# Patient Record
Sex: FEMALE | Race: WHITE | NOT HISPANIC OR LATINO | URBAN - NONMETROPOLITAN AREA
[De-identification: names, ages, dates, MRNs, and addresses within clinical notes are randomized per-mention and may not be internally consistent; named-entity substitution may affect disease eponyms.]

---

## 2019-07-02 NOTE — PATIENT DISCUSSION
Retinal tear and detachment warning symptoms reviewed and patient instructed to call immediately if increasing floaters, flashes, or decreasing peripheral vision. yes

## 2020-09-17 ENCOUNTER — IMPORTED ENCOUNTER (OUTPATIENT)
Dept: URBAN - NONMETROPOLITAN AREA CLINIC 1 | Facility: CLINIC | Age: 60
End: 2020-09-17

## 2020-09-17 PROBLEM — Z01.818: Noted: 2020-09-17

## 2020-09-17 PROBLEM — H25.813: Noted: 2020-09-17

## 2020-09-17 PROBLEM — E78.5: Noted: 2020-09-17

## 2020-09-17 PROBLEM — I10: Noted: 2020-09-17

## 2020-09-17 PROCEDURE — 92004 COMPRE OPH EXAM NEW PT 1/>: CPT

## 2020-09-17 NOTE — PATIENT DISCUSSION
Cataract OU-Visually significant cataract OU.-Cataract(s) causing symptomatic impairment of visual function not correctable with a tolerable change in glasses or contact lenses lighting or non-operative means resulting in specific activity limitations and/or participation restrictions including but not limited to reading viewing television driving or meeting vocational or recreational needs. -Expectation is clearer vision and functional improvement in symptoms as well as reduced glare disability after cataract removal.-Order IOLMaster and OPD today. -Recommend Standard/Traditional OU based on today's OPD testing and lifestyle questionnaire.-All questions were answered regarding surgery including pre and post-op medications appointments activity restrictions and anesthetic usage.-The risks benefits and alternatives and special risk factors for the patient were discussed in detail including but not limited to: bleeding infection retinal detachment vitreous loss problems with the implant and possible need for additional surgery.-Although rare the possibility of complete vision loss was discussed.-The possible need for glasses post-operatively was discussed.-Order medical clearance exam based on history of hypertension and diabetes. -Patient elects to proceed with cataract surgery OD. Will schedule at patient's convenience and re-evaluate OS in the future. ***Patient elects Standard/Traditional OU starting with OD**Recommend Viscoat OU

## 2020-09-18 ENCOUNTER — IMPORTED ENCOUNTER (OUTPATIENT)
Dept: URBAN - NONMETROPOLITAN AREA CLINIC 1 | Facility: CLINIC | Age: 60
End: 2020-09-18

## 2020-09-18 PROBLEM — H25.813: Noted: 2020-09-18

## 2020-10-02 ENCOUNTER — IMPORTED ENCOUNTER (OUTPATIENT)
Dept: URBAN - NONMETROPOLITAN AREA CLINIC 1 | Facility: CLINIC | Age: 60
End: 2020-10-02

## 2020-10-02 PROBLEM — H25.812: Noted: 2020-10-02

## 2020-10-02 PROBLEM — Z98.41: Noted: 2020-10-02

## 2020-10-02 PROCEDURE — 99024 POSTOP FOLLOW-UP VISIT: CPT

## 2020-10-02 PROCEDURE — 92136 OPHTHALMIC BIOMETRY: CPT

## 2020-10-02 PROCEDURE — 66984 XCAPSL CTRC RMVL W/O ECP: CPT

## 2020-10-02 NOTE — PATIENT DISCUSSION
s/p PCIOL CE OD Standard/Trad -Pt doing well s/p PCIOL. -Continue post-op gtts according to instruction sheet and sleep with eye shield over eye for 7 nights.-Avoid bending at the waist lifting anything over 5lbs and dirty or bari environments. Cataract OS -Visually significant.-Cataract(s) causing symptomatic impairment of visual function not correctable with a tolerable change in glasses or contact lenses lighting or non-operative means resulting in specific activity limitations and/or participation restrictions including but not limited to reading viewing television driving or meeting vocational or recreational needs. -Expectation is clearer vision and reduced glare disability after cataract removal.-A/S for Re eval

## 2020-10-08 ENCOUNTER — IMPORTED ENCOUNTER (OUTPATIENT)
Dept: URBAN - NONMETROPOLITAN AREA CLINIC 1 | Facility: CLINIC | Age: 60
End: 2020-10-08

## 2020-10-08 PROCEDURE — 99024 POSTOP FOLLOW-UP VISIT: CPT

## 2020-10-08 NOTE — PATIENT DISCUSSION
s/p PCIOL CE OD Standard/Trad -Pt doing well s/p PCIOL. Cataract OS -Visually significant.-Cataract(s) causing symptomatic impairment of visual function not correctable with a tolerable change in glasses or contact lenses lighting or non-operative means resulting in specific activity limitations and/or participation restrictions including but not limited to reading viewing television driving or meeting vocational or recreational needs. -Expectation is clearer vision and reduced glare disability after cataract removal.-A/S for Re eval

## 2020-10-16 ENCOUNTER — IMPORTED ENCOUNTER (OUTPATIENT)
Dept: URBAN - NONMETROPOLITAN AREA CLINIC 1 | Facility: CLINIC | Age: 60
End: 2020-10-16

## 2020-10-16 PROCEDURE — 66984 XCAPSL CTRC RMVL W/O ECP: CPT

## 2020-10-16 PROCEDURE — 99024 POSTOP FOLLOW-UP VISIT: CPT

## 2020-10-16 PROCEDURE — 92136 OPHTHALMIC BIOMETRY: CPT

## 2020-10-16 NOTE — PATIENT DISCUSSION
Same Day s/p PCIOL OS stand/trad (10/16/20)-Pt doing well s/p PCIOL. -Continue post-op gtts according to instruction sheet and sleep with eye shield over eye for 7 nights.-Avoid bending at the waist lifting anything over 5lbs and dirty or bari environments. -RTC . 2 week s/p PCIOL OD stand/trad (10/02/20)-Pt doing well at 1 week s/p PCIOL. -Continue post-op gtts according to instruction sheet.-Okay to resume usual activites and d/c eye shield.

## 2022-04-09 ASSESSMENT — VISUAL ACUITY
OD_PH: 20/100-1
OS_AM: 20/70-1
OS_GLARE: 20/100
OD_GLARE: 20/100
OD_PAM: 20/70
OD_PH: 20/100-1
OS_CC: 20/400
OS_CC: 20/70-1
OD_CC: 20/20-2
OS_PH: 20/70-2
OS_AM: 20/70-1
OS_PH: 20/70-2
OS_CC: 20/90
OD_CC: 20/40
OS_SC: 20/60
OD_CC: 20/400
OS_CC: 20/400
OD_CC: 20/400
OS_GLARE: 20/100
OD_PAM: 20/70
OS_AM: 20/70-1
OD_CC: 20/60-2
OD_GLARE: 20/100
OS_PH: 20/70-2
OS_GLARE: 20/100
OS_SC: 20/60
OS_CC: 20/400

## 2022-04-09 ASSESSMENT — TONOMETRY
OS_IOP_MMHG: 16
OS_IOP_MMHG: 16
OD_IOP_MMHG: 20
OD_IOP_MMHG: 15
OS_IOP_MMHG: 19
OD_IOP_MMHG: 17
OS_IOP_MMHG: 16
OD_IOP_MMHG: 16

## 2022-11-16 ENCOUNTER — ESTABLISHED PATIENT (OUTPATIENT)
Dept: RURAL CLINIC 3 | Facility: CLINIC | Age: 62
End: 2022-11-16

## 2022-11-16 DIAGNOSIS — H52.4: ICD-10-CM

## 2022-11-16 PROCEDURE — S0621 ROUTINE OPHTHALMOLOGICAL EXA: HCPCS

## 2022-11-16 ASSESSMENT — VISUAL ACUITY
OS_SC: 20/20-1
OD_SC: 20/25-1

## 2022-11-16 ASSESSMENT — TONOMETRY
OD_IOP_MMHG: 14
OS_IOP_MMHG: 15

## 2022-11-16 NOTE — PATIENT DISCUSSION
Discussed diagnosis with patient. Recommend refer to [facility] for further evaluation and treatment. Patient elects to schedule.

## 2024-07-31 ENCOUNTER — COMPREHENSIVE EXAM (OUTPATIENT)
Dept: RURAL CLINIC 3 | Facility: CLINIC | Age: 64
End: 2024-07-31

## 2024-07-31 DIAGNOSIS — H52.4: ICD-10-CM

## 2024-07-31 DIAGNOSIS — H43.813: ICD-10-CM

## 2024-07-31 DIAGNOSIS — E11.9: ICD-10-CM

## 2024-07-31 DIAGNOSIS — H02.831: ICD-10-CM

## 2024-07-31 DIAGNOSIS — H02.834: ICD-10-CM

## 2024-07-31 DIAGNOSIS — H16.223: ICD-10-CM

## 2024-07-31 DIAGNOSIS — Z96.1: ICD-10-CM

## 2024-07-31 PROCEDURE — 92015 DETERMINE REFRACTIVE STATE: CPT

## 2024-07-31 PROCEDURE — 92014 COMPRE OPH EXAM EST PT 1/>: CPT

## 2024-07-31 ASSESSMENT — TONOMETRY
OD_IOP_MMHG: 14
OS_IOP_MMHG: 13

## 2024-07-31 ASSESSMENT — VISUAL ACUITY
OD_SC: 20/40
OD_PH: 20/30-2
OS_SC: 20/20-1